# Patient Record
Sex: MALE | Race: WHITE | NOT HISPANIC OR LATINO | Employment: STUDENT | URBAN - METROPOLITAN AREA
[De-identification: names, ages, dates, MRNs, and addresses within clinical notes are randomized per-mention and may not be internally consistent; named-entity substitution may affect disease eponyms.]

---
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
## 2025-02-21 ENCOUNTER — HOSPITAL ENCOUNTER (EMERGENCY)
Facility: HOSPITAL | Age: 20
Discharge: HOME | DRG: 885 | End: 2025-02-21
Attending: EMERGENCY MEDICINE
Payer: COMMERCIAL

## 2025-02-21 ENCOUNTER — HOSPITAL ENCOUNTER (INPATIENT)
Facility: HOSPITAL | Age: 20
LOS: 4 days | Discharge: HOME | End: 2025-02-25
Attending: PSYCHIATRY & NEUROLOGY | Admitting: PSYCHIATRY & NEUROLOGY
Payer: COMMERCIAL

## 2025-02-21 VITALS
HEIGHT: 69 IN | DIASTOLIC BLOOD PRESSURE: 85 MMHG | HEART RATE: 82 BPM | TEMPERATURE: 97.9 F | SYSTOLIC BLOOD PRESSURE: 150 MMHG | OXYGEN SATURATION: 100 % | BODY MASS INDEX: 35.84 KG/M2 | WEIGHT: 242 LBS | RESPIRATION RATE: 14 BRPM

## 2025-02-21 DIAGNOSIS — R45.851 DEPRESSION WITH SUICIDAL IDEATION: ICD-10-CM

## 2025-02-21 DIAGNOSIS — F10.920 ALCOHOLIC INTOXICATION WITHOUT COMPLICATION: ICD-10-CM

## 2025-02-21 DIAGNOSIS — Z00.8 MEDICAL CLEARANCE FOR PSYCHIATRIC ADMISSION: Primary | ICD-10-CM

## 2025-02-21 DIAGNOSIS — F32.A DEPRESSION WITH SUICIDAL IDEATION: ICD-10-CM

## 2025-02-21 PROBLEM — F10.10 ALCOHOL ABUSE: Status: ACTIVE | Noted: 2025-02-21

## 2025-02-21 PROBLEM — F33.2 SEVERE RECURRENT MAJOR DEPRESSION WITHOUT PSYCHOTIC FEATURES: Status: ACTIVE | Noted: 2025-02-21

## 2025-02-21 LAB
ALBUMIN SERPL-MCNC: 4.9 G/DL (ref 3.5–5.2)
ALBUMIN/GLOB SERPL: 1.5 G/DL
ALP SERPL-CCNC: 102 U/L (ref 39–117)
ALT SERPL W P-5'-P-CCNC: 29 U/L (ref 1–41)
AMPHET+METHAMPHET UR QL: NEGATIVE
AMPHETAMINES UR QL: NEGATIVE
ANION GAP SERPL CALCULATED.3IONS-SCNC: 14.2 MMOL/L (ref 5–15)
APAP SERPL-MCNC: <5 MCG/ML (ref 0–30)
AST SERPL-CCNC: 24 U/L (ref 1–40)
BARBITURATES UR QL SCN: NEGATIVE
BASOPHILS # BLD AUTO: 0.04 10*3/MM3 (ref 0–0.2)
BASOPHILS NFR BLD AUTO: 0.5 % (ref 0–1.5)
BENZODIAZ UR QL SCN: NEGATIVE
BILIRUB SERPL-MCNC: 0.2 MG/DL (ref 0–1.2)
BILIRUB UR QL STRIP: NEGATIVE
BUN SERPL-MCNC: 8 MG/DL (ref 6–20)
BUN/CREAT SERPL: 8.8 (ref 7–25)
BUPRENORPHINE SERPL-MCNC: NEGATIVE NG/ML
CALCIUM SPEC-SCNC: 9.4 MG/DL (ref 8.6–10.5)
CANNABINOIDS SERPL QL: NEGATIVE
CHLORIDE SERPL-SCNC: 103 MMOL/L (ref 98–107)
CLARITY UR: CLEAR
CO2 SERPL-SCNC: 23.8 MMOL/L (ref 22–29)
COCAINE UR QL: NEGATIVE
COLOR UR: YELLOW
CREAT SERPL-MCNC: 0.91 MG/DL (ref 0.76–1.27)
DEPRECATED RDW RBC AUTO: 42.4 FL (ref 37–54)
EGFRCR SERPLBLD CKD-EPI 2021: 123.7 ML/MIN/1.73
EOSINOPHIL # BLD AUTO: 0.11 10*3/MM3 (ref 0–0.4)
EOSINOPHIL NFR BLD AUTO: 1.3 % (ref 0.3–6.2)
ERYTHROCYTE [DISTWIDTH] IN BLOOD BY AUTOMATED COUNT: 13.6 % (ref 12.3–15.4)
ETHANOL BLD-MCNC: 53 MG/DL (ref 0–10)
ETHANOL UR QL: 0.05 %
FENTANYL UR-MCNC: NEGATIVE NG/ML
GLOBULIN UR ELPH-MCNC: 3.3 GM/DL
GLUCOSE SERPL-MCNC: 99 MG/DL (ref 65–99)
GLUCOSE UR STRIP-MCNC: NEGATIVE MG/DL
HCT VFR BLD AUTO: 51.9 % (ref 37.5–51)
HGB BLD-MCNC: 17.2 G/DL (ref 13–17.7)
HGB UR QL STRIP.AUTO: NEGATIVE
HOLD SPECIMEN: NORMAL
HOLD SPECIMEN: NORMAL
IMM GRANULOCYTES # BLD AUTO: 0.02 10*3/MM3 (ref 0–0.05)
IMM GRANULOCYTES NFR BLD AUTO: 0.2 % (ref 0–0.5)
KETONES UR QL STRIP: NEGATIVE
LEUKOCYTE ESTERASE UR QL STRIP.AUTO: NEGATIVE
LYMPHOCYTES # BLD AUTO: 1.62 10*3/MM3 (ref 0.7–3.1)
LYMPHOCYTES NFR BLD AUTO: 19.4 % (ref 19.6–45.3)
MCH RBC QN AUTO: 28.5 PG (ref 26.6–33)
MCHC RBC AUTO-ENTMCNC: 33.1 G/DL (ref 31.5–35.7)
MCV RBC AUTO: 86.1 FL (ref 79–97)
METHADONE UR QL SCN: NEGATIVE
MONOCYTES # BLD AUTO: 0.65 10*3/MM3 (ref 0.1–0.9)
MONOCYTES NFR BLD AUTO: 7.8 % (ref 5–12)
NEUTROPHILS NFR BLD AUTO: 5.89 10*3/MM3 (ref 1.7–7)
NEUTROPHILS NFR BLD AUTO: 70.8 % (ref 42.7–76)
NITRITE UR QL STRIP: NEGATIVE
NRBC BLD AUTO-RTO: 0 /100 WBC (ref 0–0.2)
OPIATES UR QL: NEGATIVE
OXYCODONE UR QL SCN: NEGATIVE
PCP UR QL SCN: NEGATIVE
PH UR STRIP.AUTO: 6 [PH] (ref 5–8)
PLATELET # BLD AUTO: 370 10*3/MM3 (ref 140–450)
PMV BLD AUTO: 10.1 FL (ref 6–12)
POTASSIUM SERPL-SCNC: 4.2 MMOL/L (ref 3.5–5.2)
PROT SERPL-MCNC: 8.2 G/DL (ref 6–8.5)
PROT UR QL STRIP: NEGATIVE
RBC # BLD AUTO: 6.03 10*6/MM3 (ref 4.14–5.8)
SALICYLATES SERPL-MCNC: <0.3 MG/DL
SODIUM SERPL-SCNC: 141 MMOL/L (ref 136–145)
SP GR UR STRIP: 1.01 (ref 1–1.03)
T4 FREE SERPL-MCNC: 1.43 NG/DL (ref 0.92–1.68)
TRICYCLICS UR QL SCN: NEGATIVE
TSH SERPL DL<=0.05 MIU/L-ACNC: 2.23 UIU/ML (ref 0.27–4.2)
UROBILINOGEN UR QL STRIP: NORMAL
WBC NRBC COR # BLD AUTO: 8.33 10*3/MM3 (ref 3.4–10.8)
WHOLE BLOOD HOLD COAG: NORMAL
WHOLE BLOOD HOLD SPECIMEN: NORMAL

## 2025-02-21 PROCEDURE — 81003 URINALYSIS AUTO W/O SCOPE: CPT | Performed by: PSYCHIATRY & NEUROLOGY

## 2025-02-21 PROCEDURE — 84443 ASSAY THYROID STIM HORMONE: CPT | Performed by: PSYCHIATRY & NEUROLOGY

## 2025-02-21 PROCEDURE — 80053 COMPREHEN METABOLIC PANEL: CPT | Performed by: NURSE PRACTITIONER

## 2025-02-21 PROCEDURE — 80307 DRUG TEST PRSMV CHEM ANLYZR: CPT | Performed by: NURSE PRACTITIONER

## 2025-02-21 PROCEDURE — 82077 ASSAY SPEC XCP UR&BREATH IA: CPT | Performed by: NURSE PRACTITIONER

## 2025-02-21 PROCEDURE — 84439 ASSAY OF FREE THYROXINE: CPT | Performed by: PSYCHIATRY & NEUROLOGY

## 2025-02-21 PROCEDURE — 80179 DRUG ASSAY SALICYLATE: CPT | Performed by: NURSE PRACTITIONER

## 2025-02-21 PROCEDURE — 85025 COMPLETE CBC W/AUTO DIFF WBC: CPT | Performed by: NURSE PRACTITIONER

## 2025-02-21 PROCEDURE — 80143 DRUG ASSAY ACETAMINOPHEN: CPT | Performed by: NURSE PRACTITIONER

## 2025-02-21 PROCEDURE — 36415 COLL VENOUS BLD VENIPUNCTURE: CPT

## 2025-02-21 PROCEDURE — 99283 EMERGENCY DEPT VISIT LOW MDM: CPT

## 2025-02-21 RX ORDER — DIPHENHYDRAMINE HYDROCHLORIDE 50 MG/ML
50 INJECTION INTRAMUSCULAR; INTRAVENOUS EVERY 4 HOURS PRN
Status: DISCONTINUED | OUTPATIENT
Start: 2025-02-21 | End: 2025-02-25 | Stop reason: HOSPADM

## 2025-02-21 RX ORDER — NICOTINE 21 MG/24HR
1 PATCH, TRANSDERMAL 24 HOURS TRANSDERMAL DAILY PRN
Status: DISCONTINUED | OUTPATIENT
Start: 2025-02-21 | End: 2025-02-25 | Stop reason: HOSPADM

## 2025-02-21 RX ORDER — ALUMINA, MAGNESIA, AND SIMETHICONE 2400; 2400; 240 MG/30ML; MG/30ML; MG/30ML
15 SUSPENSION ORAL EVERY 6 HOURS PRN
Status: DISCONTINUED | OUTPATIENT
Start: 2025-02-21 | End: 2025-02-25 | Stop reason: HOSPADM

## 2025-02-21 RX ORDER — HYDROXYZINE HYDROCHLORIDE 25 MG/1
50 TABLET, FILM COATED ORAL EVERY 6 HOURS PRN
Status: DISCONTINUED | OUTPATIENT
Start: 2025-02-21 | End: 2025-02-25 | Stop reason: HOSPADM

## 2025-02-21 RX ORDER — HALOPERIDOL 5 MG/1
5 TABLET ORAL EVERY 4 HOURS PRN
Status: DISCONTINUED | OUTPATIENT
Start: 2025-02-21 | End: 2025-02-25 | Stop reason: HOSPADM

## 2025-02-21 RX ORDER — LORAZEPAM 2 MG/ML
2 INJECTION INTRAMUSCULAR EVERY 4 HOURS PRN
Status: DISCONTINUED | OUTPATIENT
Start: 2025-02-21 | End: 2025-02-25 | Stop reason: HOSPADM

## 2025-02-21 RX ORDER — SODIUM CHLORIDE 0.9 % (FLUSH) 0.9 %
10 SYRINGE (ML) INJECTION AS NEEDED
Status: DISCONTINUED | OUTPATIENT
Start: 2025-02-21 | End: 2025-02-21 | Stop reason: HOSPADM

## 2025-02-21 RX ORDER — LOPERAMIDE HYDROCHLORIDE 2 MG/1
2 CAPSULE ORAL
Status: DISCONTINUED | OUTPATIENT
Start: 2025-02-21 | End: 2025-02-25 | Stop reason: HOSPADM

## 2025-02-21 RX ORDER — LORAZEPAM 2 MG/1
2 TABLET ORAL EVERY 4 HOURS PRN
Status: DISCONTINUED | OUTPATIENT
Start: 2025-02-21 | End: 2025-02-25 | Stop reason: HOSPADM

## 2025-02-21 RX ORDER — ACETAMINOPHEN 325 MG/1
650 TABLET ORAL EVERY 6 HOURS PRN
Status: DISCONTINUED | OUTPATIENT
Start: 2025-02-21 | End: 2025-02-25 | Stop reason: HOSPADM

## 2025-02-21 RX ORDER — HALOPERIDOL 5 MG/ML
5 INJECTION INTRAMUSCULAR EVERY 4 HOURS PRN
Status: DISCONTINUED | OUTPATIENT
Start: 2025-02-21 | End: 2025-02-25 | Stop reason: HOSPADM

## 2025-02-21 RX ORDER — TRAZODONE HYDROCHLORIDE 100 MG/1
100 TABLET ORAL NIGHTLY PRN
Status: DISCONTINUED | OUTPATIENT
Start: 2025-02-21 | End: 2025-02-25 | Stop reason: HOSPADM

## 2025-02-21 RX ORDER — DIPHENHYDRAMINE HCL 50 MG
50 CAPSULE ORAL EVERY 4 HOURS PRN
Status: DISCONTINUED | OUTPATIENT
Start: 2025-02-21 | End: 2025-02-25 | Stop reason: HOSPADM

## 2025-02-21 RX ADMIN — TRAZODONE HYDROCHLORIDE 100 MG: 100 TABLET ORAL at 21:16

## 2025-02-21 RX ADMIN — SERTRALINE HYDROCHLORIDE 50 MG: 50 TABLET ORAL at 13:11
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
## 2025-02-21 NOTE — PLAN OF CARE
Goal Outcome Evaluation:  Plan of Care Reviewed With: patient  Patient Agreement with Plan of Care: disagrees (describe) (States just wants to go home to his grnadma and dogs.)     Progress: no change.  Patient newly arrived to the unit. Patient has been assessed and Plan of Care initiated based on needs, goals, and treatment. Patient participated in assessments and was encouraged to make needs known. Supportive care and safe environment provided.

## 2025-02-21 NOTE — SIGNIFICANT NOTE
02/21/25 1122   Group Therapy Session   Group Attendance excused from group session     Admitted today. Pt was offered to join for recreation group but declined.

## 2025-02-21 NOTE — PLAN OF CARE
"Goal Outcome Evaluation:     Patient Agreement with Plan of Care: disagrees (describe)    Pt is AOX4, able to make needs known clear speech, pt denies SI/HI and AVH, pt rate anxiety 0 and depression 0. Pt showered today and but not wanting to ea,  pt is medication compliant, pt has been withdrawn to room, guarded but answers questions appropriately, pt describes mood as \"happy\". NO s/s of distress at this time.                                      "

## 2025-02-22 PROCEDURE — 63710000001 ONDANSETRON ODT 4 MG TABLET DISPERSIBLE: Performed by: PSYCHIATRY & NEUROLOGY

## 2025-02-22 RX ORDER — ONDANSETRON 4 MG/1
4 TABLET, ORALLY DISINTEGRATING ORAL EVERY 6 HOURS PRN
Status: DISCONTINUED | OUTPATIENT
Start: 2025-02-22 | End: 2025-02-25 | Stop reason: HOSPADM

## 2025-02-22 RX ADMIN — ONDANSETRON 4 MG: 4 TABLET, ORALLY DISINTEGRATING ORAL at 22:42

## 2025-02-22 RX ADMIN — TRAZODONE HYDROCHLORIDE 100 MG: 100 TABLET ORAL at 21:37

## 2025-02-22 RX ADMIN — ACETAMINOPHEN 650 MG: 325 TABLET ORAL at 22:42

## 2025-02-22 RX ADMIN — SERTRALINE HYDROCHLORIDE 50 MG: 50 TABLET ORAL at 08:12

## 2025-02-22 NOTE — PLAN OF CARE
Goal Outcome Evaluation:  Plan of Care Reviewed With: patient  Patient Agreement with Plan of Care: disagrees (describe) (does not feel like he needs to be [facility])         Pt. Is quiet and withdrawn to his room with minimal interaction, reports sleeping well and has voices that he does not feel like he belongs in a place like [facility], MIW process was reviewed with patient. Pt. Has been cooperative and denied any si/hi/avh. Will con't to monitor and provide a safe environment.

## 2025-02-22 NOTE — PLAN OF CARE
Goal Outcome Evaluation:  Plan of Care Reviewed With: patient  Patient Agreement with Plan of Care: disagrees (describe) (patient feels he does not need to be here.)                                Patient was on phone multiple times throughout shift. Has been calm and cooperative. Denies depression, anxiety, SI, HI, and A/VH. Patient explains he doesn't feel he should be here because his action was impulsive and he didn't really mean it. Patient educated on safety precautions and coping skills to help reduce impulsivity. Patient receptive. Patient did attend group. Patient requested Trazodone for sleep and slept well. Safe environment provided.    Detail Level: Zone Plan: Scissor removal performed today as a courtesy to pt

## 2025-02-22 NOTE — PROGRESS NOTES
"Psychiatry Progress Note    2025    Legal Status: MIW/72 hours court order    Chief Complaint: Stressed    Subjective:    The patient was seen, discussed with nursing staff, recent clinical data reviewed.  Per nursing report remained isolated with flat affect, has been eating and sleeping, no adverse behavior.  The patient denied any side effect from Zoloft.  The patient admitted that he was under stress prior to this admission,especially after his friend .  Patient did not believe that he was at risk of harming himself and did not believe that he needed to be in the hospital.  The patient did not express any interest in groups however he was encouraged to participate.  The patient appeared to be disheveled, stated that last time he took shower was prior to his admission.      Vital Signs    Vitals:    25 0454 25 1050 25 1900 25 0810   BP: 141/87 131/83 132/69 144/77   BP Location: Right leg Left arm Left arm Left arm   Patient Position: Sitting Sitting Sitting Sitting   Pulse: 71 92 69 79   Resp: 16 16 18 18   Temp: 98.2 °F (36.8 °C) 98.1 °F (36.7 °C) 98.2 °F (36.8 °C) 98.1 °F (36.7 °C)   TempSrc: Oral Oral Oral Oral   SpO2: 100% 100% 98% 99%   Weight: 107 kg (236 lb 8.9 oz)      Height: 175.3 cm (69\")          Mental Status Exam:   19-year-old  male who appeared his stated age, moderately groomed, cooperative.  The patient speech was soft-spoken.  The patient described his mood as okay, affect was flattened.  Thought process essentially linear but limited.  Thought content: Patient denied any active suicidal ideations with desire to die, denied hopelessness, denied homicidal ideation, denied paranoia or abnormal perceptions.  Formal memory assessment was performed and deemed to be fair, concentration presented.  Insight and judgment impaired.    Lab Results: Results source: EMR   Lab Results (last 24 hours)       ** No results found for the last 24 hours. **      "       Radiology Results:  Imaging Results (Last 24 Hours)       ** No results found for the last 24 hours. **            Medicine:   Current Facility-Administered Medications:     acetaminophen (TYLENOL) tablet 650 mg, 650 mg, Oral, Q6H PRN, Blanca Burleson MD    aluminum-magnesium hydroxide-simethicone (MAALOX MAX) 400-400-40 MG/5ML suspension 15 mL, 15 mL, Oral, Q6H PRN, Blanca Burleson MD    LORazepam (ATIVAN) tablet 2 mg, 2 mg, Oral, Q4H PRN **AND** haloperidol (HALDOL) tablet 5 mg, 5 mg, Oral, Q4H PRN **AND** diphenhydrAMINE (BENADRYL) capsule 50 mg, 50 mg, Oral, Q4H PRN, Blanca Burleson MD    haloperidol lactate (HALDOL) injection 5 mg, 5 mg, Intramuscular, Q4H PRN **AND** diphenhydrAMINE (BENADRYL) injection 50 mg, 50 mg, Intramuscular, Q4H PRN **AND** LORazepam (ATIVAN) injection 2 mg, 2 mg, Intramuscular, Q4H PRN, Blanca Burleson MD    hydrOXYzine (ATARAX) tablet 50 mg, 50 mg, Oral, Q6H PRN, Blanca Burleson MD    loperamide (IMODIUM) capsule 2 mg, 2 mg, Oral, Q2H PRN, Blanca Burleson MD    magnesium hydroxide (MILK OF MAGNESIA) suspension 10 mL, 10 mL, Oral, Daily PRN, Blanca Burleson MD    nicotine (NICODERM CQ) 21 MG/24HR patch 1 patch, 1 patch, Transdermal, Daily PRN, Blanca Burleson MD    sertraline (ZOLOFT) tablet 50 mg, 50 mg, Oral, Daily, Kwame Carpenter MD, 50 mg at 02/22/25 0812    traZODone (DESYREL) tablet 100 mg, 100 mg, Oral, Nightly PRN, Blanca Burleson MD, 100 mg at 02/21/25 2116    Diagnoses/Assessment:     Esophageal reflux    Severe recurrent major depression without psychotic features    Alcohol abuse      Treatment Plan:    1) Will continue care for the patient on the behavioral health unit at [facility] to ensure patient safety.  2) Will continue to provide treatment with the unit milieu, activities, therapies and psychopharmacological management.  3) supportive approach, continue current medication regiment, titrate as indicated.  4) will try  to obtain collateral information  5) work on appropriate discharge plan and disposition    Treatment plan and medication risks and benefits discussed with: Patient    Blanca Burleson MD  02/22/25 at 11:39 EST

## 2025-02-23 RX ADMIN — TRAZODONE HYDROCHLORIDE 100 MG: 100 TABLET ORAL at 22:27

## 2025-02-23 RX ADMIN — SERTRALINE HYDROCHLORIDE 50 MG: 50 TABLET ORAL at 08:25

## 2025-02-23 NOTE — PLAN OF CARE
Goal Outcome Evaluation:  Plan of Care Reviewed With: patient  Patient Agreement with Plan of Care: agrees      Pt. Has been quiet and withdrawn with depressed affect and minimal interaction, reports he is fine and hopes that the doctor will release him tomorrow. Pt. Con't to deny si/hi/avh and has been cooperative with medication. Pt. Had a visit with his grandfather today that seam to go well. Will con't to monitor and provide a safe environment.

## 2025-02-23 NOTE — PROGRESS NOTES
Psychiatry Progress Note    2/23/2025    Legal Status: MIW/72 hours court order    Chief Complaint: better today    Subjective:    The patient was seen, discussed with nursing staff, recent clinical data reviewed.  Per nursing report the patient had 1 episode of vomiting, took 1 dose of Zofran, stayed mostly in his room, no adverse behavior.  The patient was in his room today, denied feeling nauseous or having any stomach discomfort.  The patient stated that he took medicine this morning, Zoloft and did not experience any nausea.  The patient denied having any episodes of diarrhea.  The patient described his mood as fine, denied any active suicidal ideation or desire to harm himself.  The patient stated that his grandparents supposed to come and visit him today and hope that next week he will be able to go home.  Vital Signs    Vitals:    02/22/25 0810 02/22/25 1900 02/22/25 2222 02/23/25 0835   BP: 144/77 139/86 137/89 141/72   BP Location: Left arm Left arm Left arm Left arm   Patient Position: Sitting Sitting Sitting Sitting   Pulse: 79 77 65 70   Resp: 18 18 20 18   Temp: 98.1 °F (36.7 °C) 98.2 °F (36.8 °C) 97.5 °F (36.4 °C) 97.9 °F (36.6 °C)   TempSrc: Oral Oral Oral Oral   SpO2: 99% 99% 98% 100%   Weight:       Height:           Mental Status Exam:   19-year-old  male who appeared his stated age, moderately groomed, cooperative.  The patient speech was soft-spoken.  The patient described his mood as okay, affect was flattened.  Thought process essentially linear but limited.  Thought content: Patient denied any active suicidal ideations with desire to die, denied hopelessness, denied homicidal ideation, denied paranoia or abnormal perceptions.  Formal memory assessment was performed and deemed to be fair, concentration presented.  Insight and judgment impaired.    Lab Results: Results source: EMR   Lab Results (last 24 hours)       ** No results found for the last 24 hours. **            Radiology  Results:  Imaging Results (Last 24 Hours)       ** No results found for the last 24 hours. **            Medicine:   Current Facility-Administered Medications:     acetaminophen (TYLENOL) tablet 650 mg, 650 mg, Oral, Q6H PRN, Blanca Burleson MD, 650 mg at 02/22/25 2242    aluminum-magnesium hydroxide-simethicone (MAALOX MAX) 400-400-40 MG/5ML suspension 15 mL, 15 mL, Oral, Q6H PRN, Blanca Burleson MD    LORazepam (ATIVAN) tablet 2 mg, 2 mg, Oral, Q4H PRN **AND** haloperidol (HALDOL) tablet 5 mg, 5 mg, Oral, Q4H PRN **AND** diphenhydrAMINE (BENADRYL) capsule 50 mg, 50 mg, Oral, Q4H PRN, Blanca Burleson MD    haloperidol lactate (HALDOL) injection 5 mg, 5 mg, Intramuscular, Q4H PRN **AND** diphenhydrAMINE (BENADRYL) injection 50 mg, 50 mg, Intramuscular, Q4H PRN **AND** LORazepam (ATIVAN) injection 2 mg, 2 mg, Intramuscular, Q4H PRN, Blanca Burleson MD    hydrOXYzine (ATARAX) tablet 50 mg, 50 mg, Oral, Q6H PRN, Blanca Burleson MD    loperamide (IMODIUM) capsule 2 mg, 2 mg, Oral, Q2H PRN, Blanca Burleson MD    magnesium hydroxide (MILK OF MAGNESIA) suspension 10 mL, 10 mL, Oral, Daily PRN, Blanca Burleson MD    nicotine (NICODERM CQ) 21 MG/24HR patch 1 patch, 1 patch, Transdermal, Daily PRN, Blanca Burleson MD    ondansetron ODT (ZOFRAN-ODT) disintegrating tablet 4 mg, 4 mg, Translingual, Q6H PRN, Blanca Burleson MD, 4 mg at 02/22/25 2242    sertraline (ZOLOFT) tablet 50 mg, 50 mg, Oral, Daily, Kwame Carpenter MD, 50 mg at 02/23/25 0825    traZODone (DESYREL) tablet 100 mg, 100 mg, Oral, Nightly PRN, Blanca Burleson MD, 100 mg at 02/22/25 4477    Diagnoses/Assessment:     Esophageal reflux    Severe recurrent major depression without psychotic features    Alcohol abuse      Treatment Plan:    1) Will continue care for the patient on the behavioral health unit at [facility] to ensure patient safety.  2) Will continue to provide treatment with the unit milieu, activities,  therapies and psychopharmacological management.  3) supportive approach, continue current medication regiment,monitor for side effects  4) will try to obtain collateral information  5) work on appropriate discharge plan and disposition    Projected length of stay 2 to 3 days  Treatment plan and medication risks and benefits discussed with: Patient    Blanca Burleson MD  02/23/25 at 10:29 EST

## 2025-02-23 NOTE — PLAN OF CARE
Goal Outcome Evaluation:  Plan of Care Reviewed With: patient  Patient Agreement with Plan of Care: disagrees (describe) (patient feels he should not be here.)                                    Patient has been withdrawn to room throughout shift. Patient remains guarded with a flat affect. Denies anxiety, depression, SI, HI, and A/VH. Patient received Trazodone for sleep. Patient went to bed around 2115. Patient awoke around 2215 vomiting. Patient reports feeling sick before bed. MD was notified of emesis and the red tinge of emesis. Zofran was ordered and to continue to monitor for worsening symptoms. VS were WNL. Patient is afebrile. No other symptoms noted except headache post emesis. Patient reports relief of symptoms. Patient has slept well the rest of night. Safe environment provided.

## 2025-02-24 RX ADMIN — SERTRALINE HYDROCHLORIDE 50 MG: 50 TABLET ORAL at 08:12

## 2025-02-24 NOTE — PROGRESS NOTES
" [facility]     Psychiatric Progress Note    Patient Name: Kelby York  : 2005  MRN: [id number]  Primary Care Physician:  Jono George DO  Date of admission: 2025    Subjective   Subjective     Patient seen and chart reviewed, discussed with staff.    Legal status: MIW    Chief Complaint: Suicidal ideations      HPI:     Patient has been compliant with groups and all other treatments.  He has been sleeping well.  His grandfather visited yesterday and went well.  He is denying suicidal or homicidal ideation.    He reports he feels normal today.  He is denying suicidal ideation.  He reports his suicidal ideations dissipated when police approached him in the [employer] parking lot.  States that he did have thoughts of ending his life when he left the home, went to the [employer] parking lot and to pray and try to find God.  He reports that it worked and he is feeling better.  He reports his mood is normal.  Reports that he has been up and out in milieu.  Reports he feels awake and alert.  Has no side effects from medication.    Feels better taking trazodone and sleep is improved      Objective   Objective     Vitals:   Temp:  [97.9 °F (36.6 °C)-98.2 °F (36.8 °C)] 97.9 °F (36.6 °C)  Heart Rate:  [63-77] 77  Resp:  [16-18] 16  BP: (136-154)/(85-96) 136/85          Mental Status Exam:      Appearance:   Well-developed, well nourished, calm, cooperative, ADLs well attended to  Reliability:   Good  Eye Contact:   Good  Concentration/Focus:    Appropriate, engaging  Behaviors:    No restlessness or agitation  Memory :    Intact  Speech:    Normal rate and volume  Language:   Appropriate, relevant  Mood :    \"It is okay right now\"  Affect:    Improved from admission and essentially euthymic   Thought process:    Goal directed  Thought Content:    Denies suicidal or homicidal ideation, no hallucinations  Insight:   Improved  Judgement:   Appropriate    Result Review    Result Review:  I have personally reviewed " the results from the time of this admission to 2/24/2025 12:02 EST and agree with these findings:  []  Laboratory  []  Microbiology  []  Radiology  []  EKG/Telemetry   []  Cardiology/Vascular   []  Pathology  []  Old records  []  Other:  Most notable findings include:     Lab Results (last 24 hours)       ** No results found for the last 24 hours. **                    Medications:   sertraline, 50 mg, Oral, Daily          Assessment / Plan       Active Hospital Problems:  Active Hospital Problems    Diagnosis     Severe recurrent major depression without psychotic features     Alcohol abuse     Esophageal reflux        Plan:     Continue current treatment protocol and titrate medications as clinically indicated  Collateral information from grandfather about the visit yesterday, but the possibility of discharge  Anticipate being able to discharge the patient without moving forward the MIW process  Work on mood stabilization and abatement of any suicidal ideation or psychosis.  Work on appropriate safety plan  Continue supportive therapy  Patient to engage in all group and individual treatment modalities available on the unit  Obtain collateral information if possible  Titrate medications as clinically indicated  Work on appropriate disposition follow-up including referrals to substance abuse treatment if indicated      Disposition:  I expect patient to be discharged tomorrow with stable.    Part of this note may be an electronic transcription/translation of spoken language to printed text using the Dragon dictation system.       Electronically signed by Kwame Carpenter MD, 02/24/25 12:02 EST

## 2025-02-24 NOTE — PLAN OF CARE
Goal Outcome Evaluation:  Plan of Care Reviewed With: patient  Patient Agreement with Plan of Care: agrees      Patient calm and cooperative. Denies suicidal and homicidal ideations. Denies a/v hallucinations. Denies anxiety and depression. Compliant with medications. Participated in group sessions.

## 2025-02-24 NOTE — PLAN OF CARE
Goal Outcome Evaluation:  Plan of Care Reviewed With: patient  Plan of Care Reviewed With: patient  Patient Agreement with Plan of Care: agrees with comment (describe) (States knows he has to be here, but wants to go home as soon as he can.)     Progress: improving.  Patient has been out of room, in dayroom interacting with peers and coloring.  Patient makes good eye contact, is easy to engage and participates in nursing assessments.  Patient has been calm and cooperative with care.  Patient states he has just been hanging out and felt better when he was able to talk to his grandpa and check on his dogs at home.  Patient and this RN talked about his dogs and those he cares about in relation to positive coping and self care.  Patient states he also spoke with his girlfriend and other friends.  When asked patient what he has to live for, he stated his dogs, his girlfriend and he wants to help his grandpa care for his grandmother.  Patient denies any S/I, H/I and A/V/H, denies feeling any depression or anxiety.  Patient states his suicidal thought was a brief occurrence, and due to the alcohol, he did something impulsive by taking off that night.  Patient did request Trazodone for sleep and this was provided.  Supportive care and safe environment continues.

## 2025-02-25 VITALS
TEMPERATURE: 97.9 F | HEIGHT: 69 IN | BODY MASS INDEX: 35.04 KG/M2 | SYSTOLIC BLOOD PRESSURE: 143 MMHG | DIASTOLIC BLOOD PRESSURE: 80 MMHG | WEIGHT: 236.55 LBS | RESPIRATION RATE: 18 BRPM | OXYGEN SATURATION: 99 % | HEART RATE: 80 BPM

## 2025-02-25 RX ORDER — TRAZODONE HYDROCHLORIDE 100 MG/1
100 TABLET ORAL NIGHTLY PRN
Qty: 15 TABLET | Refills: 1 | Status: SHIPPED | OUTPATIENT
Start: 2025-02-25

## 2025-02-25 RX ADMIN — SERTRALINE HYDROCHLORIDE 50 MG: 50 TABLET ORAL at 08:28

## 2025-02-25 NOTE — CASE MANAGEMENT/SOCIAL WORK
Collateral Contact: Patient’s Grandfather    Collateral Information:    Spoke with the patient’s grandfather regarding his visit with the patient on Sunday. He reported that the visit went well and stated that the patient appeared “more upbeat.” The grandfather expressed agreement with the patient’s discharge plan and confirmed that he will pick the patient up at 1:30 PM today.    The grandfather was informed of the patient’s prescribed medications and upcoming follow-up behavioral health appointment. He acknowledged understanding and had no concerns at this time.    Plan:     Proceed with discharge as planned.     Ensure patient receives discharge medications and appointment details.     Patient scheduled with [facility] 3-3-25 for behavioral health follow-up.

## 2025-02-25 NOTE — PLAN OF CARE
Goal Outcome Evaluation:  Plan of Care Reviewed With: patient  Patient Agreement with Plan of Care: agrees     Pt is alert and oriented and able to make needs known.  Pt denies SI or Hi.  Pt denies a/v/h.  Pt denies anxiety or depression.  Pt had snack and spent the evening in the day room.  Pt attended pm group.  No s/s of acute distress observed at this time.

## 2025-02-25 NOTE — SIGNIFICANT NOTE
"   02/25/25 1221   Group Therapy Session   Group Attendance attended group session   Time Session Began 1100   Time Session Ended 1145   Total Time (minutes) 45   Group Type recreation   Group Topic Covered leisure exploration/use of leisure time;structured socialization   Group Session Detail \"Slaps\" Game & LISSETT   Patient Participation/Contribution cooperative with task;discussed personal experience with topic;expressed understanding of topic;listened actively;organized;offered helpful suggestions to peers   Affect During Group calm;bright;facial expression relaxed     Pt participated throughout entire group with no issues. Pt was pleasant during group and engaged with peers in an appropriate and constructive manner. Expressed excitement for his discharge today and conversed with a peer about being able to smoke after he leaves the facility. Pt reported he plans to go home and rest after discharge. Pt respectful during game play with peers and was patient and encouraging with a peer that was struggling to finish dealing out cards. Pt appropriate during group and complied with all rules established by RT. No expression/indication of SI/HI or aggression.   "

## 2025-02-25 NOTE — SIGNIFICANT NOTE
02/25/25 1157   Plan   Patient/Family in Agreement with Plan yes   Final Discharge Disposition Code 01 - home or self-care     Disposition The plan is for the patient to discharge to his grandparents' home in [place], [place]. The patient reports he lives with his grandparents and assists his grandfather in providing care for his grandmother. The patient reports that he has a personal vehicle. The patient is agreeable to outpatient services with [facility].   Outpatient Services: [facility]  Appointment: 3/3/25 @ 10:30am  RN NN discussed the importance of outpatient services (therapy/medication management) for continued stabilization. Patient verbalized understanding.   Transportation: The patient's grandfather Liam Razo is agreeable to provide transportation at discharge.   time of 1:30pm at Entrance A   Pharmacy  [facility] - [place], [place] - [facility] - [phone]     Primary RN informed of discharge plan

## 2025-02-25 NOTE — PLAN OF CARE
Goal Outcome Evaluation:  Plan of Care Reviewed With: patient  Patient Agreement with Plan of Care: agrees                Patient calm and cooperative. Denies suicidal and homicidal ideations. Denies a/v hallucinations. Denies anxiety and depression. Compliant with medications. Participates in group sessions. Safety plan completed by patient prior to discharge.